# Patient Record
Sex: FEMALE | URBAN - METROPOLITAN AREA
[De-identification: names, ages, dates, MRNs, and addresses within clinical notes are randomized per-mention and may not be internally consistent; named-entity substitution may affect disease eponyms.]

---

## 2017-04-11 ENCOUNTER — TELEPHONE (OUTPATIENT)
Dept: OTOLARYNGOLOGY | Facility: CLINIC | Age: 67
End: 2017-04-11

## 2017-04-11 DIAGNOSIS — H91.91 HEARING LOSS, RIGHT: Primary | ICD-10-CM

## 2017-04-11 RX ORDER — METHYLPREDNISOLONE 4 MG
TABLET, DOSE PACK ORAL
Qty: 21 TABLET | Refills: 0 | Status: SHIPPED | OUTPATIENT
Start: 2017-04-11

## 2017-04-11 NOTE — TELEPHONE ENCOUNTER
Hailee called- she see's Dr. Hodges in [place]. She has had a recent hearing loss in the right ear. Her left ear has no hearing. She is wondering if Dr. Nissen would prescribe a medication for her hearing loss- she has previously had steriods -  Medrol does pack.  Reviewed with Dr. Nissen- new Rx for medrol does pack- and rtc ( [place] with audio).  Pt notified and understood.